# Patient Record
Sex: FEMALE | Race: WHITE | NOT HISPANIC OR LATINO | Employment: FULL TIME | URBAN - NONMETROPOLITAN AREA
[De-identification: names, ages, dates, MRNs, and addresses within clinical notes are randomized per-mention and may not be internally consistent; named-entity substitution may affect disease eponyms.]

---

## 2024-11-21 ENCOUNTER — OFFICE VISIT (OUTPATIENT)
Dept: URGENT CARE | Age: 69
End: 2024-11-21
Payer: COMMERCIAL

## 2024-11-21 VITALS
WEIGHT: 154.32 LBS | RESPIRATION RATE: 16 BRPM | OXYGEN SATURATION: 99 % | DIASTOLIC BLOOD PRESSURE: 52 MMHG | SYSTOLIC BLOOD PRESSURE: 117 MMHG | HEART RATE: 96 BPM | TEMPERATURE: 98.2 F | BODY MASS INDEX: 25.71 KG/M2 | HEIGHT: 65 IN

## 2024-11-21 DIAGNOSIS — R50.9 FEVER, UNSPECIFIED FEVER CAUSE: ICD-10-CM

## 2024-11-21 DIAGNOSIS — J02.9 SORE THROAT: ICD-10-CM

## 2024-11-21 LAB
POC RAPID INFLUENZA A: NEGATIVE
POC RAPID INFLUENZA B: NEGATIVE
POC RAPID STREP: NEGATIVE
POC SARS-COV-2 AG BINAX: NORMAL

## 2024-11-21 RX ORDER — DOXYCYCLINE HYCLATE 100 MG
TABLET ORAL
COMMUNITY
Start: 2024-11-20

## 2024-11-21 RX ORDER — SIMVASTATIN 40 MG/1
1 TABLET, FILM COATED ORAL
COMMUNITY
Start: 2024-10-28

## 2024-11-21 RX ORDER — DIAZEPAM 2 MG/1
TABLET ORAL
COMMUNITY
Start: 2024-11-02

## 2024-11-21 RX ORDER — MECLIZINE HCL 12.5 MG 12.5 MG/1
12.5 TABLET ORAL EVERY 6 HOURS PRN
COMMUNITY
Start: 2024-05-02

## 2024-11-21 RX ORDER — LEVOTHYROXINE SODIUM 150 UG/1
1 TABLET ORAL
COMMUNITY
Start: 2024-11-06

## 2024-11-21 RX ORDER — LISINOPRIL 40 MG/1
40 TABLET ORAL DAILY
COMMUNITY

## 2024-11-21 RX ORDER — PENTOXIFYLLINE 400 MG/1
400 TABLET, EXTENDED RELEASE ORAL
COMMUNITY
Start: 2024-11-05

## 2024-11-21 RX ORDER — NATEGLINIDE 60 MG/1
TABLET ORAL
COMMUNITY
Start: 2024-09-04

## 2024-11-21 RX ORDER — LEVOFLOXACIN 250 MG/1
1 TABLET ORAL
COMMUNITY
Start: 2024-04-08

## 2024-11-21 RX ORDER — METHOCARBAMOL 750 MG/1
TABLET, FILM COATED ORAL
COMMUNITY

## 2024-11-21 RX ORDER — CLONIDINE HYDROCHLORIDE 0.1 MG/1
TABLET ORAL
COMMUNITY
Start: 2020-01-08

## 2024-11-21 RX ORDER — GABAPENTIN 300 MG/1
1 CAPSULE ORAL
COMMUNITY
Start: 2024-11-12

## 2024-11-21 RX ORDER — HYDROCHLOROTHIAZIDE 25 MG/1
TABLET ORAL EVERY 24 HOURS
COMMUNITY

## 2024-11-21 RX ORDER — FAMOTIDINE 20 MG/1
1 TABLET, FILM COATED ORAL
COMMUNITY
Start: 2024-10-28

## 2024-11-21 RX ORDER — CYCLOBENZAPRINE HCL 10 MG
TABLET ORAL
COMMUNITY
Start: 2024-11-02

## 2024-11-21 RX ORDER — AMLODIPINE BESYLATE 5 MG/1
1 TABLET ORAL
COMMUNITY
Start: 2024-10-28

## 2024-11-21 RX ORDER — NAPROXEN 500 MG/1
TABLET ORAL
COMMUNITY

## 2024-11-21 RX ORDER — DAPAGLIFLOZIN 10 MG/1
TABLET, FILM COATED ORAL
COMMUNITY
Start: 2024-11-14

## 2024-11-21 RX ORDER — CLOPIDOGREL BISULFATE 75 MG/1
1 TABLET ORAL
COMMUNITY
Start: 2024-11-06

## 2024-11-21 ASSESSMENT — ENCOUNTER SYMPTOMS
SORE THROAT: 1
COUGH: 1

## 2024-11-21 NOTE — PROGRESS NOTES
"Subjective   Patient ID: Yamileth Mccormack is a 69 y.o. female. They present today with a chief complaint of Fever (Symptoms since yesterday), Other (chills), and Sore Throat.    History of Present Illness  Patient reports cough and congestion and fever yesterday, denies chest pain or shortness of breath.            Past Medical History  Allergies as of 11/21/2024 - Reviewed 11/21/2024   Allergen Reaction Noted    Augmentin [amoxicillin-pot clavulanate] GI Upset 11/21/2024    Codeine Hallucinations 11/21/2024       (Not in a hospital admission)       No past medical history on file.    Past Surgical History:   Procedure Laterality Date    CT ABDOMEN PELVIS ANGIOGRAM W AND/OR WO IV CONTRAST  3/29/2013    CT ABDOMEN PELVIS ANGIOGRAM W AND/OR WO IV CONTRAST [facility]        reports that she has been smoking cigarettes. She has never used smokeless tobacco. She reports that she does not currently use alcohol. She reports that she does not use drugs.    Review of Systems  Review of Systems   HENT:  Positive for congestion and sore throat.    Respiratory:  Positive for cough.    All other systems reviewed and are negative.                                 Objective    Vitals:    11/21/24 1105   BP: 117/52   BP Location: Left arm   Patient Position: Sitting   BP Cuff Size: Large adult   Pulse: 96   Resp: 16   Temp: 36.8 °C (98.2 °F)   TempSrc: Oral   SpO2: 99%   Weight: 70 kg (154 lb 5.2 oz)   Height: 1.651 m (5' 5\")     No LMP recorded.    Physical Exam  Vitals reviewed.   Constitutional:       Appearance: Normal appearance.   HENT:      Head: Normocephalic and atraumatic.      Right Ear: Tympanic membrane, ear canal and external ear normal.      Left Ear: Tympanic membrane, ear canal and external ear normal.      Nose: Congestion present.      Mouth/Throat:      Mouth: Mucous membranes are moist.      Pharynx: Oropharynx is clear.   Eyes:      Extraocular Movements: Extraocular movements intact.      " Conjunctiva/sclera: Conjunctivae normal.      Pupils: Pupils are equal, round, and reactive to light.   Cardiovascular:      Rate and Rhythm: Normal rate and regular rhythm.      Pulses: Normal pulses.      Heart sounds: Normal heart sounds.   Pulmonary:      Effort: Pulmonary effort is normal.      Breath sounds: Normal breath sounds.   Abdominal:      General: Abdomen is flat. Bowel sounds are normal.      Palpations: Abdomen is soft.   Musculoskeletal:         General: Normal range of motion.      Cervical back: Normal range of motion.   Skin:     General: Skin is warm.      Capillary Refill: Capillary refill takes less than 2 seconds.   Neurological:      General: No focal deficit present.      Mental Status: She is alert and oriented to person, place, and time.   Psychiatric:         Mood and Affect: Mood normal.         Behavior: Behavior normal.         Procedures    Point of Care Test & Imaging Results from this visit  Results for orders placed or performed in visit on 11/21/24   POCT Covid-19 Rapid Antigen   Result Value Ref Range    POC JULIUS-COV-2 AG  Presumptive negative test for SARS-CoV-2 (no antigen detected)     Presumptive negative test for SARS-CoV-2 (no antigen detected)   POCT Influenza A/B manually resulted   Result Value Ref Range    POC Rapid Influenza A Negative Negative    POC Rapid Influenza B Negative Negative   POCT rapid strep A manually resulted   Result Value Ref Range    POC Rapid Strep Negative Negative      No results found.    Diagnostic study results (if any) were reviewed by OPHELIA Marte.    Assessment/Plan   Allergies, medications, history, and pertinent labs/EKGs/Imaging reviewed by OPHELIA Marte.     Medical Decision Making  Patient in NAD, VSS, HRR, lungs clear.  Reports cough and congestion, fever and sore throat.  Strep, covid and flu negative  Her PCP prescribed Doxy, start that as prescribed, go to ED with any worsening symptoms,  patient agrees with plan of care.      Orders and Diagnoses  Diagnoses and all orders for this visit:  Sore throat  -     POCT rapid strep A manually resulted  Fever, unspecified fever cause  -     POCT Covid-19 Rapid Antigen  -     POCT Influenza A/B manually resulted      Medical Admin Record      Patient disposition: Home    Electronically signed by OPHELIA Marte  11:27 AM

## 2024-11-21 NOTE — LETTER
December 9, 2024     Patient: Yamileth Mccormack   YOB: 1955   Date of Visit: 11/21/2024       To Whom It May Concern:    Yamileth Mccormack was seen in my clinic on 11/21/2024. Please excuse Yamileth for her absence from work on this day to make the appointment.    If you have any questions or concerns, please don't hesitate to call.         Sincerely,         Elizabeth Lieberman, APRN-CNP        CC: No Recipients

## 2025-05-16 ENCOUNTER — NURSING HOME VISIT (OUTPATIENT)
Dept: POST ACUTE CARE | Facility: EXTERNAL LOCATION | Age: 70
End: 2025-05-16
Payer: MEDICARE

## 2025-05-16 DIAGNOSIS — Z72.0 NICOTINE ABUSE: ICD-10-CM

## 2025-05-16 DIAGNOSIS — E03.9 HYPOTHYROIDISM, UNSPECIFIED TYPE: ICD-10-CM

## 2025-05-16 DIAGNOSIS — I10 PRIMARY HYPERTENSION: ICD-10-CM

## 2025-05-16 DIAGNOSIS — N18.32 CHRONIC KIDNEY DISEASE, STAGE 3B (MULTI): ICD-10-CM

## 2025-05-16 DIAGNOSIS — R53.81 PHYSICAL DECONDITIONING: ICD-10-CM

## 2025-05-16 DIAGNOSIS — M00.9 INFECTION OF LEFT KNEE (MULTI): Primary | ICD-10-CM

## 2025-05-16 DIAGNOSIS — R11.2 NAUSEA AND VOMITING, UNSPECIFIED VOMITING TYPE: ICD-10-CM

## 2025-05-16 DIAGNOSIS — D64.9 ANEMIA, UNSPECIFIED TYPE: ICD-10-CM

## 2025-05-16 DIAGNOSIS — Z98.890 S/P OPEN REDUCTION AND INTERNAL FIXATION (ORIF) OF FRACTURE OF LEFT PATELLA: ICD-10-CM

## 2025-05-16 DIAGNOSIS — Z87.81 S/P OPEN REDUCTION AND INTERNAL FIXATION (ORIF) OF FRACTURE OF LEFT PATELLA: ICD-10-CM

## 2025-05-16 DIAGNOSIS — I73.9 PAD (PERIPHERAL ARTERY DISEASE): ICD-10-CM

## 2025-05-16 DIAGNOSIS — I25.10 CORONARY ARTERY DISEASE INVOLVING NATIVE CORONARY ARTERY OF NATIVE HEART WITHOUT ANGINA PECTORIS: ICD-10-CM

## 2025-05-16 DIAGNOSIS — E78.5 HYPERLIPIDEMIA, UNSPECIFIED HYPERLIPIDEMIA TYPE: ICD-10-CM

## 2025-05-16 DIAGNOSIS — K21.9 GASTROESOPHAGEAL REFLUX DISEASE WITHOUT ESOPHAGITIS: ICD-10-CM

## 2025-05-16 PROCEDURE — 99310 SBSQ NF CARE HIGH MDM 45: CPT | Performed by: NURSE PRACTITIONER

## 2025-05-16 NOTE — LETTER
Patient: Yamileth Mccormack  : 1955    Encounter Date: 2025    Chief Complaint:   SNF F/U  -Left knee infection  -Physical deconditioning/weakness    HPI:   70 year-old female with PMH of CAD, hypothyroidism, HLD, CKD Stage III, nicotine dependence, HTN, mood disorder, and L patellar surgery (3/25/25) who presented to the ER on 25 w/ c/f L knee surgical wound infection. Pt. reported purulent drainage to her incision. Her [facility] nurse advised her to go to the ER for evaluation. Work-up in ER: Cr 1.5, XR of L knee neg for joint effusion or patella hardware malfunction. She was started on IV ATB and admitted for further care. Hospital course:    Left knee infection-IV ATB, ortho and ID consult, knee brace placed locked in extension, WBAT, ID recommending 4 weeks of ATB therapy (doxycycline and augmentin), F/U with ID and ortho as scheduled  Physical deconditioning/weakness-PT/OT recommending SNF  HTN-BP medication adjusted, BP monitored  KENDELL-renal function monitored  Multiple BLE ulcers-local wound care, F/U with podiatry/vascular as an OP     Pt. was HDS and discharged to [facility] on 25. Today, patient reports poor PO intake, nausea, and vomiting, which she feels is 2/2 Augmentin. Staff report that she has been refusing to take Augmentin due to stomach upset/GI symptoms. She denies fevers or chills. She reports L knee pain well-controlled on current Rx. Staff report no other clinical concerns at this time.     ROS:    As above in HPI. Otherwise, all other systems have been reviewed and are negative for complaint.    Medications reviewed and verified in NH chart.     Problem List[1]     Medical History[2]    Surgical History[3]    Family History[4]    Tobacco Use History[5]    Social History     Substance and Sexual Activity   Alcohol Use Not Currently       Social History     Substance and Sexual Activity   Drug Use Never       Allergies[6]     Vital Signs:   107/76-80-18-98.2-98% on RA    Physical  Exam:  General: Sitting up in WC in NAD, alert   Head/Face: NCAT, symmetrical  Eyes: PERRLA, no injection, no discharge  ENT: Hearing not impaired, ears without scars or lesions, nasal mucosa and turbinates pink, septum midline, lips pink and moist  Neck: Supple, symmetrical  Respiratory: CTA without adventitious sounds, respirations even and nonlabored without use of accessory muscles, good air exchange  Cardio: RRR without murmur or gallops, normal S1S2, no edema, pedal pulses 3+/4 bilaterally  Chest/Breast: Symmetrical  GI: BS x 4, normoactive, non-distended, abd round and soft, no masses or tenderness  : No suprapubic tenderness or distention  MSK: Gait not assessed, joints with full ROM without pain or contractures, + generalized weakness, KI locked in extension to LLE  Skin: Skin warm and dry, no induration, DSG to L knee D&I, unstageable ulcer of L shin, L anterior foot, L knee, L heel, and L achilles   Neurologic: Cranial nerves II through XII intact, superficial touch and pain sensation intact  Psychiatric: Alert to person, place, and time, calm and cooperative     Results/Data:   5/12/25: K+ 3.4, GFR 55, TSH 0.741, HgbA1C 6.3, Hgb 9.8, Hct 30.4    Assessment/Plan:  Left knee infection s/p ORIF of L patella fracture (3/25/25)-discussed patient's refusal of Augmentin with ID, can D/C Augmentin and start on Cefuroxime 500 mg PO BID, c/w Doxycycline, ATB end date 5/28, c/w Tylenol prn for mild pain, Percocet prn for mod-severe pain, monitor labs, c/w local wound care, F/U with ortho and ID as scheduled  N/V/GI upset-2/2 Augmentin, D/C'ed per ID, start on acidophilus 1 capsule PO BID, Zofran prn, monitor  Physical deconditioning/weakness-c/w PT/OT, safety and fall precautions  HTN/HLD/CAD/PAD/PVD-2 gm Na+ diet, c/w atorvastatin, plavix, trental, nifedipine (decrease to 60 mg daily), HCTZ, and lisinopril, monitor BP and HR, monitor lipid panel and LFTs, F/U with vascular after discharge  Multiple BLE  ulcers-c/w local wound care, increase protein intake, wound care team to follow, F/U with podiatry and vascular after discharge  DM2-CCD diet, accuchecks, c/w lispro ISS and farxiga, monitor HgbA1C  Tobacco abuse-smoking cessation encouraged, c/w nicotine patch  Hypothyroidism-c/w levothyroxine, monitor TSH/FT4  Anemia-c/w MVI, monitor CBC  GERD-c/w pepcid  Neuropathy-c/w gabapentin   CKD Stage III-avoid nephrotoxic drugs, renally dose medications as able, monitor BMP     Orders:  D/C Augmentin   Cefuroxime 500 mg PO BID (stop date 5/28)   Decrease Nifedipine ER to 60 mg PO Q AM   Zofran 4 mg PO Q 6 hours prn for N/V   Acidophilus 1 capsule PO BID      Code Status:   Full Code    Time spent reviewing patient's chart on the unit (PMH, PSH, FH, Social Hx AND progress and/or consult notes, labs, and radiology results): 27 minutes  Time spent interviewing and/or examining the patient: 11 minutes  Time spent writing note on the unit: 5 minutes  Time spent reviewing POC w/ patient, family, and/or staff: 7 minutes  Total visit time: 50 minutes. Greater than 50% of time was spent on counseling and/or coordination of care of the patient. Start time: 12:59 PM, End time: 1:49 PM.          Electronically Signed By: OPHELIA Syed   8/4/25  9:29 PM       [1]  Patient Active Problem List  Diagnosis   • S/P open reduction and internal fixation (ORIF) of fracture of left patella   • Postoperative infection   • Hypertension   • Open wound of both lower extremities   • PVD (peripheral vascular disease)   • Gastroesophageal reflux disease without esophagitis   • Coronary artery disease involving native coronary artery of native heart without angina pectoris   • Hyperlipidemia   • DM type 2 with diabetic peripheral neuropathy   • Hypothyroidism   • Nicotine abuse   • Chronic kidney disease, stage 3b (Multi)   • Hyponatremia   • Anemia   • PAD (peripheral artery disease)   • Vitamin D deficiency   • Mesenteric artery  stenosis (Multi)   • Other insomnia   • Stenosis of iliac artery   • Dermatitis   [2]  Past Medical History:  Diagnosis Date   • Pancreatitis ([facility])    [3]  Past Surgical History:  Procedure Laterality Date   • APPENDECTOMY     • CHOLECYSTECTOMY     • CT ABDOMEN PELVIS ANGIOGRAM W AND/OR WO IV CONTRAST  03/29/2013    CT ABDOMEN PELVIS ANGIOGRAM W AND/OR WO IV CONTRAST [facility]   • OTHER SURGICAL HISTORY      excision of pilonidal cyst   • OTHER SURGICAL HISTORY      R iliac artery stent, aortogram/angiogram bilateral legs   • OTHER SURGICAL HISTORY  09/27/2021    left external iliac artery drug coated balloon angioplasty, left external iliac artery stent placement, and repair of left brachial artery   • PATELLA FRACTURE SURGERY Left 03/25/2025    ORIF of L patella fracture   • TRIGGER FINGER RELEASE Left    [4]  Family History  Problem Relation Name Age of Onset   • COPD Mother     • Hypertension Mother     • Other (Polio) Mother     • Stroke Father     • Cancer Brother     • Heart disease Brother     • COPD Brother     • Kidney disease Brother     • Multiple sclerosis Brother     • Diabetes Mother's Brother     • Breast cancer Maternal Grandmother     • Diabetes Paternal Grandmother     • Blindness Paternal Grandmother     [5]  Social History  Tobacco Use   Smoking Status Every Day   • Types: Cigarettes   Smokeless Tobacco Never   [6]  Allergies  Allergen Reactions   • Other Shortness of breath     Horse hair and dander   • Hydrocodone-Acetaminophen Other and Unknown   • Statins-Hmg-Coa Reductase Inhibitors Myalgia     Lipitor, severe muscle pain   • Augmentin [Amoxicillin-Pot Clavulanate] GI Upset   • Codeine Hallucinations   • Sertraline Unknown   • Hydromorphone (Bulk) GI Upset   • Levofloxacin Unknown and Nausea/vomiting     Pt had nausea and vomiting   • Nsaids (Non-Steroidal Anti-Inflammatory Drug) GI Upset     g.i. distress

## 2025-05-17 DIAGNOSIS — F11.90 OPIOID USE: ICD-10-CM

## 2025-05-17 DIAGNOSIS — M00.9 INFECTION OF LEFT KNEE (MULTI): Primary | ICD-10-CM

## 2025-05-17 RX ORDER — OXYCODONE AND ACETAMINOPHEN 5; 325 MG/1; MG/1
1 TABLET ORAL EVERY 8 HOURS PRN
Qty: 30 TABLET | Refills: 0 | Status: SHIPPED | OUTPATIENT
Start: 2025-05-17 | End: 2025-05-31

## 2025-05-17 RX ORDER — NALOXONE HYDROCHLORIDE 4 MG/.1ML
1 SPRAY NASAL AS NEEDED
Qty: 2 EACH | Refills: 0 | Status: SHIPPED | OUTPATIENT
Start: 2025-05-17

## 2025-05-22 ENCOUNTER — NURSING HOME VISIT (OUTPATIENT)
Dept: POST ACUTE CARE | Facility: EXTERNAL LOCATION | Age: 70
End: 2025-05-22
Payer: MEDICARE

## 2025-05-22 DIAGNOSIS — E78.5 HYPERLIPIDEMIA, UNSPECIFIED HYPERLIPIDEMIA TYPE: ICD-10-CM

## 2025-05-22 DIAGNOSIS — Z87.81 S/P OPEN REDUCTION AND INTERNAL FIXATION (ORIF) OF FRACTURE OF LEFT PATELLA: ICD-10-CM

## 2025-05-22 DIAGNOSIS — Z72.0 NICOTINE ABUSE: ICD-10-CM

## 2025-05-22 DIAGNOSIS — E03.9 HYPOTHYROIDISM, UNSPECIFIED TYPE: ICD-10-CM

## 2025-05-22 DIAGNOSIS — I10 HYPERTENSION, UNSPECIFIED TYPE: ICD-10-CM

## 2025-05-22 DIAGNOSIS — T81.40XD POSTOPERATIVE INFECTION, UNSPECIFIED TYPE, SUBSEQUENT ENCOUNTER: ICD-10-CM

## 2025-05-22 DIAGNOSIS — S81.802D OPEN WOUND OF BOTH LOWER EXTREMITIES, SUBSEQUENT ENCOUNTER: ICD-10-CM

## 2025-05-22 DIAGNOSIS — S81.801D OPEN WOUND OF BOTH LOWER EXTREMITIES, SUBSEQUENT ENCOUNTER: ICD-10-CM

## 2025-05-22 DIAGNOSIS — E11.42 DM TYPE 2 WITH DIABETIC PERIPHERAL NEUROPATHY: ICD-10-CM

## 2025-05-22 DIAGNOSIS — I73.9 PVD (PERIPHERAL VASCULAR DISEASE): ICD-10-CM

## 2025-05-22 DIAGNOSIS — N18.9 CHRONIC KIDNEY DISEASE, UNSPECIFIED CKD STAGE: ICD-10-CM

## 2025-05-22 DIAGNOSIS — Z98.890 S/P OPEN REDUCTION AND INTERNAL FIXATION (ORIF) OF FRACTURE OF LEFT PATELLA: ICD-10-CM

## 2025-05-22 DIAGNOSIS — N18.32 CHRONIC KIDNEY DISEASE, STAGE 3B (MULTI): Primary | ICD-10-CM

## 2025-05-22 DIAGNOSIS — I25.10 CORONARY ARTERY DISEASE INVOLVING NATIVE CORONARY ARTERY OF NATIVE HEART WITHOUT ANGINA PECTORIS: ICD-10-CM

## 2025-05-22 DIAGNOSIS — K21.9 GASTROESOPHAGEAL REFLUX DISEASE WITHOUT ESOPHAGITIS: ICD-10-CM

## 2025-05-22 PROCEDURE — 99305 1ST NF CARE MODERATE MDM 35: CPT | Performed by: INTERNAL MEDICINE

## 2025-05-22 NOTE — LETTER
Patient: Yamileth Mccormack  : 1955    Encounter Date: 2025    Name: Yamileth Mccormack  : 1955  MRN: [id number]  Visit Date: 2025  Chief Complaint: HISTORY AND PHYSICAL    HPI: Yamileth Mccormack is a 70 y.o. female with PMH remarkable for CAD, hypothyroidism, HLD, CKD 3, nicotine abuse, HTN, mood disorder, who recently underwent a left patellar surgery on 3/25/2025. Subsequently, she was receiving help from a nurse with wound care of left knee who noted purulent drainage from left knee incision upon changing dressing and thus she was directed to the ER.  Labs in ER showed creatnine of 1.5, no leukocytosis. X-ray of the left knee was negative for joint effusion or patella hardware malfunction. She was admitted to [facility] on 25, ID and ortho were consulted and she was started on IV vancomycin and Zosyn. She had PICC line placed.  Orthopedics recommended Postoperative range of motion knee brace placed with locked in extension, activity as tolerated, and weight bearing as tolerated on the left lower extremity. ID recommended 4 weeks of antibiotic therapy, doxycycline and Augmentin upon discharge. While inpatient her BP was elevated and her medication was adjusted. She was followed by podiatry while inpatient for multiple foot wounds, dressings were ordered. Once HDS, she was discharged to [facility] on 25.    Subjective: Seen and examined today. She is lying in bed awake in no acute distress. She offers no new issues or complaints.     The patient was counseled regarding diagnostic results, instructions for management, risk factor reductions, prognosis, patient and family education, impressions, risks and benefits of treatment options and importance of compliance with treatment. I have reviewed nursing notes since my last visit and document any significant changes Reviewed orders, medications, Labs. Reviewed chart looking at current medications, treatments, labs, x-rays etc.     ROS:  As above in  subjective. Otherwise, all other systems have been reviewed and are negative for complaint.    Medications:  Medications reviewed and verified in NH chart.     Medical History[1]    Surgical History[2]    Family History[3]    Social History     Tobacco Use   • Smoking status: Every Day     Types: Cigarettes   • Smokeless tobacco: Never   Substance Use Topics   • Alcohol use: Not Currently       Allergies[4]     Vital Signs:   Vital Signs were reviewed in nursing home documentation.    Physical Exam  Vitals and nursing note reviewed.   Constitutional:       Appearance: Normal appearance.   HENT:      Head: Normocephalic and atraumatic.      Nose: Nose normal.      Mouth/Throat:      Mouth: Mucous membranes are moist.   Cardiovascular:      Rate and Rhythm: Normal rate and regular rhythm.      Pulses: Normal pulses.      Heart sounds: Normal heart sounds.   Pulmonary:      Effort: Pulmonary effort is normal.      Breath sounds: Normal breath sounds.   Abdominal:      General: Bowel sounds are normal.      Palpations: Abdomen is soft.   Musculoskeletal:         General: Normal range of motion.      Cervical back: Normal range of motion.      Comments: Dressings in place BLE's   Skin:     General: Skin is warm and dry.   Neurological:      General: No focal deficit present.      Mental Status: She is alert and oriented to person, place, and time.   Psychiatric:         Mood and Affect: Mood normal.         Behavior: Behavior normal.         Results/Data:   Lab Results   Component Value Date    WBC 7.3 01/08/2020    HGB 11.7 (L) 01/08/2020    HCT 35.8 (L) 01/08/2020     01/08/2020    ALT 18 01/08/2020    AST 17 01/08/2020     01/08/2020    K 4.1 01/08/2020     01/08/2020    CREATININE 1.09 (H) 01/08/2020    BUN 33 (H) 01/08/2020    CO2 23 01/08/2020    HGBA1C 6.1 (H) 10/16/2024     Results were reviewed and addressed accordingly. Lab Results were also reviewed in [facility].     Provider Impression:    Status post open reduction internal fixation left patella with postoperative surgical wound infection   -Status post open reduction internal fixation left patella on 3/25/2025  - continue with dressing changes to left knee  - monitor for healing  - continue with PO Doxycycline and Augmentin for four weeks, tentative stop date 5/28/25 per ID  - follow up with ID  - follow up with Ortho  - continue with pain medications  - consult PT/OT, ortho rec WBAT, activity as tolerated      Multiple wounds to bilateral lower extremities/history of PVD  - continue with dressing changes per podiatry rec  - monitor for healing  - follow up with podiatry  - pain medication as needed  - continue with Plavix and pentoxifylline      DM2 with peripheral neuropathy  - monitor blood sugar levels  - continue with Farxiga and lispro insulin  - continue with gabapentin for neuropathy     CAD status post stent placement  - continue with Plavix, lipid lowering agent    Hyperlipidemia  - Continue with statin     HTN  - Continue with HCTZ 25 mg p.o. daily  - Continue with lisinopril 40 mg p.o. daily and Nifedipine(increased from 60mg to 90mg while inpatient)  - monitor vital signs    Hypothyroidism  - Continue with Synthroid     Nicotine abuse disorder  - Continue nicotine patch    CKD  - monitor kidney function    GERD  - continue with Pepcid  ----------------  Written by Sima Arechiga RN, acting as a scribe for Dr. Zuniga. This note accurately reflects the work and decisions made by Dr. Zuniga.     I, Dr. Zuniga, attest all medical record entries made by the scribe were under my direction and were personally dictated by me. I have reviewed the chart and agree that the record accurately reflects my performance of the history, physical exam, and assessment and plan.         Electronically Signed By: Wilber Moe MD   5/30/25 12:35 PM       [1]  No past medical history on file.  [2]  Past Surgical History:  Procedure Laterality Date   • CT ABDOMEN  PELVIS ANGIOGRAM W AND/OR WO IV CONTRAST  3/29/2013    CT ABDOMEN PELVIS ANGIOGRAM W AND/OR WO IV CONTRAST [facility]   [3]  No family history on file.  [4]  Allergies  Allergen Reactions   • Augmentin [Amoxicillin-Pot Clavulanate] GI Upset   • Codeine Hallucinations

## 2025-05-29 PROBLEM — I25.10 CORONARY ARTERY DISEASE INVOLVING NATIVE CORONARY ARTERY OF NATIVE HEART WITHOUT ANGINA PECTORIS: Status: ACTIVE | Noted: 2025-05-29

## 2025-05-29 PROBLEM — Z98.890: Status: ACTIVE | Noted: 2025-05-29

## 2025-05-29 PROBLEM — N18.9 CHRONIC KIDNEY DISEASE: Status: ACTIVE | Noted: 2025-05-29

## 2025-05-29 PROBLEM — I10 HYPERTENSION: Status: ACTIVE | Noted: 2025-05-29

## 2025-05-29 PROBLEM — T81.40XA POSTOPERATIVE INFECTION: Status: ACTIVE | Noted: 2025-05-29

## 2025-05-29 PROBLEM — Z87.81: Status: ACTIVE | Noted: 2025-05-29

## 2025-05-29 PROBLEM — E11.42 DM TYPE 2 WITH DIABETIC PERIPHERAL NEUROPATHY: Status: ACTIVE | Noted: 2025-05-29

## 2025-05-29 PROBLEM — S81.802A OPEN WOUND OF BOTH LOWER EXTREMITIES: Status: ACTIVE | Noted: 2025-05-29

## 2025-05-29 PROBLEM — S81.801A OPEN WOUND OF BOTH LOWER EXTREMITIES: Status: ACTIVE | Noted: 2025-05-29

## 2025-05-29 PROBLEM — E03.9 HYPOTHYROIDISM: Status: ACTIVE | Noted: 2025-05-29

## 2025-05-29 PROBLEM — E78.5 HYPERLIPIDEMIA: Status: ACTIVE | Noted: 2025-05-29

## 2025-05-29 PROBLEM — I73.9 PVD (PERIPHERAL VASCULAR DISEASE): Status: ACTIVE | Noted: 2025-05-29

## 2025-05-29 PROBLEM — Z72.0 NICOTINE ABUSE: Status: ACTIVE | Noted: 2025-05-29

## 2025-05-29 PROBLEM — K21.9 GASTROESOPHAGEAL REFLUX DISEASE WITHOUT ESOPHAGITIS: Status: ACTIVE | Noted: 2025-05-29

## 2025-05-29 NOTE — PROGRESS NOTES
Name: Yamileth Mccormack  : 1955  MRN: [id number]  Visit Date: 2025  Chief Complaint: HISTORY AND PHYSICAL    HPI: Yamileth Mccormack is a 70 y.o. female with PMH remarkable for CAD, hypothyroidism, HLD, CKD 3, nicotine abuse, HTN, mood disorder, who recently underwent a left patellar surgery on 3/25/2025. Subsequently, she was receiving help from a nurse with wound care of left knee who noted purulent drainage from left knee incision upon changing dressing and thus she was directed to the ER.  Labs in ER showed creatnine of 1.5, no leukocytosis. X-ray of the left knee was negative for joint effusion or patella hardware malfunction. She was admitted to [facility] on 25, ID and ortho were consulted and she was started on IV vancomycin and Zosyn. She had PICC line placed.  Orthopedics recommended Postoperative range of motion knee brace placed with locked in extension, activity as tolerated, and weight bearing as tolerated on the left lower extremity. ID recommended 4 weeks of antibiotic therapy, doxycycline and Augmentin upon discharge. While inpatient her BP was elevated and her medication was adjusted. She was followed by podiatry while inpatient for multiple foot wounds, dressings were ordered. Once HDS, she was discharged to [facility] on 25.    Subjective: Seen and examined today. She is lying in bed awake in no acute distress. She offers no new issues or complaints.     The patient was counseled regarding diagnostic results, instructions for management, risk factor reductions, prognosis, patient and family education, impressions, risks and benefits of treatment options and importance of compliance with treatment. I have reviewed nursing notes since my last visit and document any significant changes Reviewed orders, medications, Labs. Reviewed chart looking at current medications, treatments, labs, x-rays etc.     ROS:  As above in subjective. Otherwise, all other systems have been reviewed and are  negative for complaint.    Medications:  Medications reviewed and verified in NH chart.     Medical History[1]    Surgical History[2]    Family History[3]    Social History     Tobacco Use    Smoking status: Every Day     Types: Cigarettes    Smokeless tobacco: Never   Substance Use Topics    Alcohol use: Not Currently       Allergies[4]     Vital Signs:   Vital Signs were reviewed in nursing home documentation.    Physical Exam  Vitals and nursing note reviewed.   Constitutional:       Appearance: Normal appearance.   HENT:      Head: Normocephalic and atraumatic.      Nose: Nose normal.      Mouth/Throat:      Mouth: Mucous membranes are moist.   Cardiovascular:      Rate and Rhythm: Normal rate and regular rhythm.      Pulses: Normal pulses.      Heart sounds: Normal heart sounds.   Pulmonary:      Effort: Pulmonary effort is normal.      Breath sounds: Normal breath sounds.   Abdominal:      General: Bowel sounds are normal.      Palpations: Abdomen is soft.   Musculoskeletal:         General: Normal range of motion.      Cervical back: Normal range of motion.      Comments: Dressings in place BLE's   Skin:     General: Skin is warm and dry.   Neurological:      General: No focal deficit present.      Mental Status: She is alert and oriented to person, place, and time.   Psychiatric:         Mood and Affect: Mood normal.         Behavior: Behavior normal.         Results/Data:   Lab Results   Component Value Date    WBC 7.3 01/08/2020    HGB 11.7 (L) 01/08/2020    HCT 35.8 (L) 01/08/2020     01/08/2020    ALT 18 01/08/2020    AST 17 01/08/2020     01/08/2020    K 4.1 01/08/2020     01/08/2020    CREATININE 1.09 (H) 01/08/2020    BUN 33 (H) 01/08/2020    CO2 23 01/08/2020    HGBA1C 6.1 (H) 10/16/2024     Results were reviewed and addressed accordingly. Lab Results were also reviewed in [facility].     Provider Impression:   Status post open reduction internal fixation left patella with  postoperative surgical wound infection   -Status post open reduction internal fixation left patella on 3/25/2025  - continue with dressing changes to left knee  - monitor for healing  - continue with PO Doxycycline and Augmentin for four weeks, tentative stop date 5/28/25 per ID  - follow up with ID  - follow up with Ortho  - continue with pain medications  - consult PT/OT, ortho rec WBAT, activity as tolerated      Multiple wounds to bilateral lower extremities/history of PVD  - continue with dressing changes per podiatry rec  - monitor for healing  - follow up with podiatry  - pain medication as needed  - continue with Plavix and pentoxifylline      DM2 with peripheral neuropathy  - monitor blood sugar levels  - continue with Farxiga and lispro insulin  - continue with gabapentin for neuropathy     CAD status post stent placement  - continue with Plavix, lipid lowering agent    Hyperlipidemia  - Continue with statin     HTN  - Continue with HCTZ 25 mg p.o. daily  - Continue with lisinopril 40 mg p.o. daily and Nifedipine(increased from 60mg to 90mg while inpatient)  - monitor vital signs    Hypothyroidism  - Continue with Synthroid     Nicotine abuse disorder  - Continue nicotine patch    CKD  - monitor kidney function    GERD  - continue with Pepcid  ----------------  Written by Sima Arechiga RN, acting as a scribe for Dr. Zuniga. This note accurately reflects the work and decisions made by Dr. Zuniga.     I, Dr. uZniga, attest all medical record entries made by the scribe were under my direction and were personally dictated by me. I have reviewed the chart and agree that the record accurately reflects my performance of the history, physical exam, and assessment and plan.          [1] No past medical history on file.  [2]   Past Surgical History:  Procedure Laterality Date    CT ABDOMEN PELVIS ANGIOGRAM W AND/OR WO IV CONTRAST  3/29/2013    CT ABDOMEN PELVIS ANGIOGRAM W AND/OR WO IV CONTRAST [facility]   [3] No  family history on file.  [4]   Allergies  Allergen Reactions    Augmentin [Amoxicillin-Pot Clavulanate] GI Upset    Codeine Hallucinations

## 2025-05-30 PROBLEM — N18.32 CHRONIC KIDNEY DISEASE, STAGE 3B (MULTI): Status: ACTIVE | Noted: 2025-05-30

## 2025-06-11 ENCOUNTER — NURSING HOME VISIT (OUTPATIENT)
Dept: POST ACUTE CARE | Facility: EXTERNAL LOCATION | Age: 70
End: 2025-06-11
Payer: MEDICARE

## 2025-06-11 DIAGNOSIS — I10 HYPERTENSION, UNSPECIFIED TYPE: ICD-10-CM

## 2025-06-11 DIAGNOSIS — N18.32 CHRONIC KIDNEY DISEASE, STAGE 3B (MULTI): ICD-10-CM

## 2025-06-11 DIAGNOSIS — I73.9 PVD (PERIPHERAL VASCULAR DISEASE): ICD-10-CM

## 2025-06-11 DIAGNOSIS — K21.9 GASTROESOPHAGEAL REFLUX DISEASE WITHOUT ESOPHAGITIS: ICD-10-CM

## 2025-06-11 DIAGNOSIS — I25.10 CORONARY ARTERY DISEASE INVOLVING NATIVE CORONARY ARTERY OF NATIVE HEART WITHOUT ANGINA PECTORIS: ICD-10-CM

## 2025-06-11 DIAGNOSIS — S81.801D OPEN WOUND OF BOTH LOWER EXTREMITIES, SUBSEQUENT ENCOUNTER: ICD-10-CM

## 2025-06-11 DIAGNOSIS — T81.40XD POSTOPERATIVE INFECTION, UNSPECIFIED TYPE, SUBSEQUENT ENCOUNTER: ICD-10-CM

## 2025-06-11 DIAGNOSIS — Z87.81 S/P OPEN REDUCTION AND INTERNAL FIXATION (ORIF) OF FRACTURE OF LEFT PATELLA: ICD-10-CM

## 2025-06-11 DIAGNOSIS — E78.5 HYPERLIPIDEMIA, UNSPECIFIED HYPERLIPIDEMIA TYPE: ICD-10-CM

## 2025-06-11 DIAGNOSIS — Z98.890 S/P OPEN REDUCTION AND INTERNAL FIXATION (ORIF) OF FRACTURE OF LEFT PATELLA: ICD-10-CM

## 2025-06-11 DIAGNOSIS — E11.42 DM TYPE 2 WITH DIABETIC PERIPHERAL NEUROPATHY: ICD-10-CM

## 2025-06-11 DIAGNOSIS — S81.802D OPEN WOUND OF BOTH LOWER EXTREMITIES, SUBSEQUENT ENCOUNTER: ICD-10-CM

## 2025-06-11 DIAGNOSIS — Z72.0 NICOTINE ABUSE: ICD-10-CM

## 2025-06-11 DIAGNOSIS — E03.9 HYPOTHYROIDISM, UNSPECIFIED TYPE: ICD-10-CM

## 2025-06-11 PROCEDURE — 99305 1ST NF CARE MODERATE MDM 35: CPT | Performed by: INTERNAL MEDICINE

## 2025-06-11 NOTE — LETTER
Patient: Yamileth Mccormack  : 1955    Encounter Date: 2025    Name: Yamileth Mccormack  : 1955  MRN: [id number]  Visit Date: 2025  Chief Complaint: HISTORY AND PHYSICAL    HPI: Yamileth Mccormack is a 70 y.o. female with PMH remarkable for CAD, hypothyroidism, HLD, CKD 3, nicotine abuse, HTN, mood disorder, who recently underwent a left patellar surgery on 3/25/2025. Subsequently, she was receiving help from a nurse with wound care of left knee who noted purulent drainage from left knee incision upon changing dressing and thus she was directed to the ER. She presented to the ER at [facility] on 25.  Labs in ER at that time showed creatnine of 1.5, no leukocytosis. X-ray of the left knee was negative for joint effusion or patella hardware malfunction. She was admitted to [facility] on 25, ID and ortho were consulted and she was started on IV vancomycin and Zosyn. She had PICC line placed.  Orthopedics recommended Postoperative range of motion knee brace placed with locked in extension, activity as tolerated, and weight bearing as tolerated on the left lower extremity. ID recommended 4 weeks of antibiotic therapy, doxycycline and Augmentin upon discharge. While inpatient her BP was elevated and her medication was adjusted. She was followed by podiatry while inpatient for multiple foot wounds, dressings were ordered. Once HDS, she was discharged to [facility] on 25 where she stayed for skilled care until she was discharged to home on 25 with home health care in place. She then returned to the ER at [facility] from home on 25 for increased pain left knee ulcer with warmth, redness and drainage. In the ER, she was  Found to have elevated sCr(1.4) and troponin (0.2), normal WBC, XR w/ left knee effusion. She was admitted on IV Abx for wound infection, heparin infusion for NSTEMI. Orthopedics also consulted -performed Irrigation debridement left knee with application of wound VAC and injection left knee  for evaluation of joint capsular violation on 6/2/25. Wound culture grew MRSA. For NSTEMI cardiology was consulted, she underwent LHC on 6/2/25 with no significant stenosis found. Nifedipine, HCTZ discontinued, Coreg started. SNF recommended by PTOT, patient opted for home therapy instead initially, but then was agreeable to SNF placement and this was arranged. PICC line placed prior to discharge for IV Abx. She was discharged to [facility] on 6/9/25.    Subjective: Seen and examined today. She is sitting up in bed awake in no acute distress. She denies any new issues or complaints.     The patient was counseled regarding diagnostic results, instructions for management, risk factor reductions, prognosis, patient and family education, impressions, risks and benefits of treatment options and importance of compliance with treatment. I have reviewed nursing notes since my last visit and document any significant changes Reviewed orders, medications, Labs. Reviewed chart looking at current medications, treatments, labs, x-rays etc.     ROS:  As above in subjective. Otherwise, all other systems have been reviewed and are negative for complaint.    Medications:  Medications reviewed and verified in NH chart.     Medical History[1]    Surgical History[2]    Family History[3]    Social History     Tobacco Use   • Smoking status: Every Day     Types: Cigarettes   • Smokeless tobacco: Never   Substance Use Topics   • Alcohol use: Not Currently       Allergies[4]     Vital Signs:   Vital Signs were reviewed in nursing home documentation.    Physical Exam  Vitals and nursing note reviewed.   Constitutional:       Appearance: Normal appearance.   HENT:      Head: Normocephalic and atraumatic.      Nose: Nose normal.      Mouth/Throat:      Mouth: Mucous membranes are moist.   Cardiovascular:      Rate and Rhythm: Normal rate and regular rhythm.      Pulses: Normal pulses.      Heart sounds: Normal heart sounds.   Pulmonary:       Effort: Pulmonary effort is normal.      Breath sounds: Normal breath sounds.   Abdominal:      General: Bowel sounds are normal.      Palpations: Abdomen is soft.   Musculoskeletal:         General: Normal range of motion.      Cervical back: Normal range of motion.      Comments: Woundvac dressing in place to left knee   Skin:     General: Skin is warm and dry.   Neurological:      General: No focal deficit present.      Mental Status: She is alert and oriented to person, place, and time.   Psychiatric:         Mood and Affect: Mood normal.         Behavior: Behavior normal.         Results/Data:   Lab Results   Component Value Date    WBC 7.3 01/08/2020    HGB 11.7 (L) 01/08/2020    HCT 35.8 (L) 01/08/2020     01/08/2020    ALT 18 01/08/2020    AST 17 01/08/2020     01/08/2020    K 4.1 01/08/2020     01/08/2020    CREATININE 1.09 (H) 01/08/2020    BUN 33 (H) 01/08/2020    CO2 23 01/08/2020    HGBA1C 6.1 (H) 10/16/2024     Results were reviewed and addressed accordingly. Lab Results were also reviewed in [facility].     Provider Impression:   Status post open reduction internal fixation left patella with postoperative surgical wound infection   -Status post open reduction internal fixation left patella on 3/25/2025  - previous hospitalization from 4/30-->5/9/25, completed 4 week course of PO Doxycycline and Augmentin per ID rec, stop date of 5/28/25  - presented to ER again after discharge to home from this facility with sx's infection left knee, underwent Irrigation debridement left knee with application of wound VAC and injection left knee for evaluation of joint capsular violation on 6/2/25; wound cx grew MRSA  - PICC line in place for four weeks IV ATB's(Vanco) per ID  - continue with Bactrim   - PICC line care per protocol  - continue with woundvac dressing changes to left knee per ortho  - monitor for healing  - follow up with ID  - follow up with Ortho  - continue with pain medications  -  consult PT/OT     Multiple wounds to bilateral lower extremities/history of PVD  - continue with dressing changes per podiatry rec  - monitor for healing  - follow up with podiatry  - pain medication as needed  - continue with Plavix and pentoxifylline      DM2 with peripheral neuropathy  - monitor blood sugar levels  - continue with Farxiga and lispro insulin  - continue with gabapentin for neuropathy     CAD status post stent placement with NSTEMI during most recent hospitalization  - she underwent LHC on 6/2/25 while inpatient with no significant stenosis noted  - continue with Plavix, lipid lowering agent    Hyperlipidemia  - Continue with statin     HTN  - Lisinopril and hydrochlorothiazide, Nifedipine were stopped during most recent hospitalization  - she was started on Carvedilol 6.25mg bid and Amlodipine 5mg daily  - monitor vital signs    Hypothyroidism  - Continue with Synthroid  - TSH on 6/6/25 WNL     Nicotine abuse disorder  - Continue nicotine patch    CKD  - monitor kidney function which was WNL on 6/9/25    GERD  - continue with Pepcid    Hyponatremia  - monitor Na level, was 134 on 6/9/25    Anemia  - monitor CBC  ----------------  Written by Sima Arechiga RN, acting as a scribe for Dr. Zuniga. This note accurately reflects the work and decisions made by Dr. Zuniga.     I, Dr. Zuniga, attest all medical record entries made by the scribe were under my direction and were personally dictated by me. I have reviewed the chart and agree that the record accurately reflects my performance of the history, physical exam, and assessment and plan.           Electronically Signed By: Wilber Moe MD   6/27/25 11:17 AM       [1]  No past medical history on file.  [2]  Past Surgical History:  Procedure Laterality Date   • CT ABDOMEN PELVIS ANGIOGRAM W AND/OR WO IV CONTRAST  3/29/2013    CT ABDOMEN PELVIS ANGIOGRAM W AND/OR WO IV CONTRAST [facility]   [3]  No family history on file.  [4]  Allergies  Allergen  Reactions   • Augmentin [Amoxicillin-Pot Clavulanate] GI Upset   • Codeine Hallucinations

## 2025-06-13 DIAGNOSIS — T81.40XS POSTOPERATIVE INFECTION, UNSPECIFIED TYPE, SEQUELA: Primary | ICD-10-CM

## 2025-06-13 RX ORDER — OXYCODONE HYDROCHLORIDE 5 MG/1
5 TABLET ORAL EVERY 8 HOURS PRN
Qty: 21 TABLET | Refills: 0 | Status: SHIPPED | OUTPATIENT
Start: 2025-06-13 | End: 2025-06-20

## 2025-06-20 DIAGNOSIS — T81.40XS POSTOPERATIVE INFECTION, UNSPECIFIED TYPE, SEQUELA: ICD-10-CM

## 2025-06-20 RX ORDER — OXYCODONE HYDROCHLORIDE 5 MG/1
5 TABLET ORAL EVERY 8 HOURS PRN
Qty: 21 TABLET | Refills: 0 | Status: SHIPPED | OUTPATIENT
Start: 2025-06-20

## 2025-06-26 ENCOUNTER — NURSING HOME VISIT (OUTPATIENT)
Dept: POST ACUTE CARE | Facility: EXTERNAL LOCATION | Age: 70
End: 2025-06-26
Payer: MEDICARE

## 2025-06-26 DIAGNOSIS — S81.801D OPEN WOUND OF BOTH LOWER EXTREMITIES, SUBSEQUENT ENCOUNTER: ICD-10-CM

## 2025-06-26 DIAGNOSIS — Z87.81 S/P OPEN REDUCTION AND INTERNAL FIXATION (ORIF) OF FRACTURE OF LEFT PATELLA: ICD-10-CM

## 2025-06-26 DIAGNOSIS — E78.5 HYPERLIPIDEMIA, UNSPECIFIED HYPERLIPIDEMIA TYPE: ICD-10-CM

## 2025-06-26 DIAGNOSIS — I10 HYPERTENSION, UNSPECIFIED TYPE: ICD-10-CM

## 2025-06-26 DIAGNOSIS — D64.9 ANEMIA, UNSPECIFIED TYPE: ICD-10-CM

## 2025-06-26 DIAGNOSIS — N18.32 CHRONIC KIDNEY DISEASE, STAGE 3B (MULTI): ICD-10-CM

## 2025-06-26 DIAGNOSIS — Z98.890 S/P OPEN REDUCTION AND INTERNAL FIXATION (ORIF) OF FRACTURE OF LEFT PATELLA: ICD-10-CM

## 2025-06-26 DIAGNOSIS — E03.9 HYPOTHYROIDISM, UNSPECIFIED TYPE: ICD-10-CM

## 2025-06-26 DIAGNOSIS — S81.802D OPEN WOUND OF BOTH LOWER EXTREMITIES, SUBSEQUENT ENCOUNTER: ICD-10-CM

## 2025-06-26 DIAGNOSIS — T81.40XD POSTOPERATIVE INFECTION, UNSPECIFIED TYPE, SUBSEQUENT ENCOUNTER: ICD-10-CM

## 2025-06-26 DIAGNOSIS — Z72.0 NICOTINE ABUSE: ICD-10-CM

## 2025-06-26 DIAGNOSIS — I25.10 CORONARY ARTERY DISEASE INVOLVING NATIVE CORONARY ARTERY OF NATIVE HEART WITHOUT ANGINA PECTORIS: ICD-10-CM

## 2025-06-26 DIAGNOSIS — K21.9 GASTROESOPHAGEAL REFLUX DISEASE WITHOUT ESOPHAGITIS: ICD-10-CM

## 2025-06-26 DIAGNOSIS — E11.42 DM TYPE 2 WITH DIABETIC PERIPHERAL NEUROPATHY: ICD-10-CM

## 2025-06-26 DIAGNOSIS — E87.1 HYPONATREMIA: ICD-10-CM

## 2025-06-26 PROCEDURE — 99309 SBSQ NF CARE MODERATE MDM 30: CPT | Performed by: INTERNAL MEDICINE

## 2025-06-26 NOTE — LETTER
knee for evaluation of joint capsular violation on 6/2/25. Wound culture grew MRSA. For NSTEMI cardiology was consulted, she underwent LHC on 6/2/25 with no significant stenosis found. Nifedipine, HCTZ discontinued, Coreg started. SNF recommended by PTOT, patient opted for home therapy instead initially, but then was agreeable to SNF placement and this was arranged. PICC line placed prior to discharge for IV Abx. She was discharged to [facility] on 6/9/25.    Subjective: Seen and examined today. She is sitting up in bed awake in no acute distress. She denies any new issues or complaints.     ROS:  As above in HPI. Otherwise, all other systems have been reviewed and are negative for complaint.    Medications:  Medications reviewed and verified in NH chart.     Vital Signs: Reviewed in [facility]      Physical Exam  Vitals and nursing note reviewed.   Constitutional:       Appearance: Normal appearance.   HENT:      Head: Normocephalic and atraumatic.      Nose: Nose normal.      Mouth/Throat:      Mouth: Mucous membranes are moist.   Cardiovascular:      Rate and Rhythm: Normal rate and regular rhythm.      Pulses: Normal pulses.      Heart sounds: Normal heart sounds.   Pulmonary:      Effort: Pulmonary effort is normal.      Breath sounds: Normal breath sounds.   Abdominal:      General: Bowel sounds are normal.      Palpations: Abdomen is soft.   Musculoskeletal:         General: Normal range of motion.      Cervical back: Normal range of motion.      Comments: Woundvac dressing in place to left knee   Skin:     General: Skin is warm and dry.   Neurological:      General: No focal deficit present.      Mental Status: She is alert and oriented to person, place, and time.   Psychiatric:         Mood and Affect: Mood normal.         Behavior: Behavior normal.     Results/Data:   Lab Results   Component Value Date    WBC 7.3 01/08/2020    HGB 11.7 (L) 01/08/2020    HCT 35.8 (L) 01/08/2020     01/08/2020    ALT 18  01/08/2020    AST 17 01/08/2020     01/08/2020    K 4.1 01/08/2020     01/08/2020    CREATININE 1.09 (H) 01/08/2020    BUN 33 (H) 01/08/2020    CO2 23 01/08/2020    HGBA1C 6.1 (H) 10/16/2024       Provider Impression:   Status post open reduction internal fixation left patella with postoperative surgical wound infection   -Status post open reduction internal fixation left patella on 3/25/2025  - previous hospitalization from 4/30-->5/9/25, completed 4 week course of PO Doxycycline and Augmentin per ID rec, stop date of 5/28/25  - presented to ER again after discharge to home from this facility with sx's infection left knee, underwent Irrigation debridement left knee with application of wound VAC and injection left knee for evaluation of joint capsular violation on 6/2/25; wound cx grew MRSA  - PICC line in place for four weeks IV ATB's(Vanco) per ID  - continue with Bactrim   - PICC line care per protocol  - continue with woundvac dressing changes to left knee per ortho  - monitor for healing  - follow up with ID  - follow up with Ortho  - continue with pain medications  - continue with PT/OT     Multiple wounds to bilateral lower extremities/history of PVD  - continue with dressing changes per podiatry rec  - continue to monitor for healing  - follow up with podiatry  - pain medication as needed  - continue with Plavix and pentoxifylline      DM2 with peripheral neuropathy  - continue to monitor blood sugar levels  - continue with Farxiga and lispro insulin  - continue with gabapentin for neuropathy     CAD status post stent placement with NSTEMI during most recent hospitalization  - she underwent LHC on 6/2/25 while inpatient with no significant stenosis noted  - continue with Plavix, lipid lowering agent    Hyperlipidemia  - Continue with statin     HTN  - Lisinopril and hydrochlorothiazide, Nifedipine were stopped during most recent hospitalization  - she was started on Carvedilol 6.25mg bid and  Amlodipine 5mg daily  - continue to monitor vital signs which have been stable    Hypothyroidism  - Continue with Synthroid  - TSH on 6/6/25 WNL     Nicotine abuse disorder  - Continue nicotine patch    CKD  - continue to monitor kidney function which was WNL on 6/24/25    GERD  - continue with Pepcid    Hyponatremia  -continue to monitor Na level, was 134 on 6/9/25    Anemia  - continue to monitor CBC    ----------------  Written by Sima Arechiga RN, acting as a scribe for Dr. Zuniga. This note accurately reflects the work and decisions made by Dr. Zuniga.     I, Dr. Zuniga, attest all medical record entries made by the scribe were under my direction and were personally dictated by me. I have reviewed the chart and agree that the record accurately reflects my performance of the history, physical exam, and assessment and plan.     Electronically Signed By: Wilber Moe MD   7/7/25  8:29 AM

## 2025-06-26 NOTE — Clinical Note
Patient: Yamileth Mccormack  : 1955    Encounter Date: 2025    No notes on file    Electronically Signed By: Wilber Moe MD   25  8:50 PM

## 2025-06-26 NOTE — PROGRESS NOTES
Name: Yamileth Mccormack  : 1955  MRN: [id number]  Visit Date: 2025  Chief Complaint: HISTORY AND PHYSICAL    HPI: Yamileth Mccormack is a 70 y.o. female with PMH remarkable for CAD, hypothyroidism, HLD, CKD 3, nicotine abuse, HTN, mood disorder, who recently underwent a left patellar surgery on 3/25/2025. Subsequently, she was receiving help from a nurse with wound care of left knee who noted purulent drainage from left knee incision upon changing dressing and thus she was directed to the ER. She presented to the ER at [facility] on 25.  Labs in ER at that time showed creatnine of 1.5, no leukocytosis. X-ray of the left knee was negative for joint effusion or patella hardware malfunction. She was admitted to [facility] on 25, ID and ortho were consulted and she was started on IV vancomycin and Zosyn. She had PICC line placed.  Orthopedics recommended Postoperative range of motion knee brace placed with locked in extension, activity as tolerated, and weight bearing as tolerated on the left lower extremity. ID recommended 4 weeks of antibiotic therapy, doxycycline and Augmentin upon discharge. While inpatient her BP was elevated and her medication was adjusted. She was followed by podiatry while inpatient for multiple foot wounds, dressings were ordered. Once HDS, she was discharged to [facility] on 25 where she stayed for skilled care until she was discharged to home on 25 with home health care in place. She then returned to the ER at [facility] from home on 25 for increased pain left knee ulcer with warmth, redness and drainage. In the ER, she was  Found to have elevated sCr(1.4) and troponin (0.2), normal WBC, XR w/ left knee effusion. She was admitted on IV Abx for wound infection, heparin infusion for NSTEMI. Orthopedics also consulted -performed Irrigation debridement left knee with application of wound VAC and injection left knee for evaluation of joint capsular violation on 25. Wound culture  grew MRSA. For NSTEMI cardiology was consulted, she underwent LHC on 6/2/25 with no significant stenosis found. Nifedipine, HCTZ discontinued, Coreg started. SNF recommended by PTOT, patient opted for home therapy instead initially, but then was agreeable to SNF placement and this was arranged. PICC line placed prior to discharge for IV Abx. She was discharged to [facility] on 6/9/25.    Subjective: Seen and examined today. She is sitting up in bed awake in no acute distress. She denies any new issues or complaints.     The patient was counseled regarding diagnostic results, instructions for management, risk factor reductions, prognosis, patient and family education, impressions, risks and benefits of treatment options and importance of compliance with treatment. I have reviewed nursing notes since my last visit and document any significant changes Reviewed orders, medications, Labs. Reviewed chart looking at current medications, treatments, labs, x-rays etc.     ROS:  As above in subjective. Otherwise, all other systems have been reviewed and are negative for complaint.    Medications:  Medications reviewed and verified in NH chart.     Medical History[1]    Surgical History[2]    Family History[3]    Social History     Tobacco Use    Smoking status: Every Day     Types: Cigarettes    Smokeless tobacco: Never   Substance Use Topics    Alcohol use: Not Currently       Allergies[4]     Vital Signs:   Vital Signs were reviewed in nursing home documentation.    Physical Exam  Vitals and nursing note reviewed.   Constitutional:       Appearance: Normal appearance.   HENT:      Head: Normocephalic and atraumatic.      Nose: Nose normal.      Mouth/Throat:      Mouth: Mucous membranes are moist.   Cardiovascular:      Rate and Rhythm: Normal rate and regular rhythm.      Pulses: Normal pulses.      Heart sounds: Normal heart sounds.   Pulmonary:      Effort: Pulmonary effort is normal.      Breath sounds: Normal breath  sounds.   Abdominal:      General: Bowel sounds are normal.      Palpations: Abdomen is soft.   Musculoskeletal:         General: Normal range of motion.      Cervical back: Normal range of motion.      Comments: Woundvac dressing in place to left knee   Skin:     General: Skin is warm and dry.   Neurological:      General: No focal deficit present.      Mental Status: She is alert and oriented to person, place, and time.   Psychiatric:         Mood and Affect: Mood normal.         Behavior: Behavior normal.         Results/Data:   Lab Results   Component Value Date    WBC 7.3 01/08/2020    HGB 11.7 (L) 01/08/2020    HCT 35.8 (L) 01/08/2020     01/08/2020    ALT 18 01/08/2020    AST 17 01/08/2020     01/08/2020    K 4.1 01/08/2020     01/08/2020    CREATININE 1.09 (H) 01/08/2020    BUN 33 (H) 01/08/2020    CO2 23 01/08/2020    HGBA1C 6.1 (H) 10/16/2024     Results were reviewed and addressed accordingly. Lab Results were also reviewed in [facility].     Provider Impression:   Status post open reduction internal fixation left patella with postoperative surgical wound infection   -Status post open reduction internal fixation left patella on 3/25/2025  - previous hospitalization from 4/30-->5/9/25, completed 4 week course of PO Doxycycline and Augmentin per ID rec, stop date of 5/28/25  - presented to ER again after discharge to home from this facility with sx's infection left knee, underwent Irrigation debridement left knee with application of wound VAC and injection left knee for evaluation of joint capsular violation on 6/2/25; wound cx grew MRSA  - PICC line in place for four weeks IV ATB's(Vanco) per ID  - continue with Bactrim   - PICC line care per protocol  - continue with woundvac dressing changes to left knee per ortho  - monitor for healing  - follow up with ID  - follow up with Ortho  - continue with pain medications  - consult PT/OT     Multiple wounds to bilateral lower extremities/history  of PVD  - continue with dressing changes per podiatry rec  - monitor for healing  - follow up with podiatry  - pain medication as needed  - continue with Plavix and pentoxifylline      DM2 with peripheral neuropathy  - monitor blood sugar levels  - continue with Farxiga and lispro insulin  - continue with gabapentin for neuropathy     CAD status post stent placement with NSTEMI during most recent hospitalization  - she underwent LHC on 6/2/25 while inpatient with no significant stenosis noted  - continue with Plavix, lipid lowering agent    Hyperlipidemia  - Continue with statin     HTN  - Lisinopril and hydrochlorothiazide, Nifedipine were stopped during most recent hospitalization  - she was started on Carvedilol 6.25mg bid and Amlodipine 5mg daily  - monitor vital signs    Hypothyroidism  - Continue with Synthroid  - TSH on 6/6/25 WNL     Nicotine abuse disorder  - Continue nicotine patch    CKD  - monitor kidney function which was WNL on 6/9/25    GERD  - continue with Pepcid    Hyponatremia  - monitor Na level, was 134 on 6/9/25    Anemia  - monitor CBC  ----------------  Written by Sima Arechiga RN, acting as a scribe for Dr. Zuniga. This note accurately reflects the work and decisions made by Dr. Zuniga.     I, Dr. Zuniga, attest all medical record entries made by the scribe were under my direction and were personally dictated by me. I have reviewed the chart and agree that the record accurately reflects my performance of the history, physical exam, and assessment and plan.            [1] No past medical history on file.  [2]   Past Surgical History:  Procedure Laterality Date    CT ABDOMEN PELVIS ANGIOGRAM W AND/OR WO IV CONTRAST  3/29/2013    CT ABDOMEN PELVIS ANGIOGRAM W AND/OR WO IV CONTRAST [facility]   [3] No family history on file.  [4]   Allergies  Allergen Reactions    Augmentin [Amoxicillin-Pot Clavulanate] GI Upset    Codeine Hallucinations

## 2025-07-05 ENCOUNTER — NURSING HOME VISIT (OUTPATIENT)
Dept: POST ACUTE CARE | Facility: EXTERNAL LOCATION | Age: 70
End: 2025-07-05
Payer: MEDICARE

## 2025-07-05 DIAGNOSIS — S81.802D OPEN WOUND OF BOTH LOWER EXTREMITIES, SUBSEQUENT ENCOUNTER: ICD-10-CM

## 2025-07-05 DIAGNOSIS — S81.801D OPEN WOUND OF BOTH LOWER EXTREMITIES, SUBSEQUENT ENCOUNTER: ICD-10-CM

## 2025-07-05 DIAGNOSIS — I10 HYPERTENSION, UNSPECIFIED TYPE: ICD-10-CM

## 2025-07-05 DIAGNOSIS — E11.42 DM TYPE 2 WITH DIABETIC PERIPHERAL NEUROPATHY: ICD-10-CM

## 2025-07-05 DIAGNOSIS — N18.9 CHRONIC KIDNEY DISEASE, UNSPECIFIED CKD STAGE: ICD-10-CM

## 2025-07-05 DIAGNOSIS — I25.10 CORONARY ARTERY DISEASE INVOLVING NATIVE CORONARY ARTERY OF NATIVE HEART WITHOUT ANGINA PECTORIS: ICD-10-CM

## 2025-07-05 DIAGNOSIS — E87.1 HYPONATREMIA: ICD-10-CM

## 2025-07-05 DIAGNOSIS — Z87.81 S/P OPEN REDUCTION AND INTERNAL FIXATION (ORIF) OF FRACTURE OF LEFT PATELLA: ICD-10-CM

## 2025-07-05 DIAGNOSIS — E03.9 HYPOTHYROIDISM, UNSPECIFIED TYPE: ICD-10-CM

## 2025-07-05 DIAGNOSIS — Z98.890 S/P OPEN REDUCTION AND INTERNAL FIXATION (ORIF) OF FRACTURE OF LEFT PATELLA: ICD-10-CM

## 2025-07-05 DIAGNOSIS — T81.40XD POSTOPERATIVE INFECTION, UNSPECIFIED TYPE, SUBSEQUENT ENCOUNTER: ICD-10-CM

## 2025-07-05 DIAGNOSIS — K21.9 GASTROESOPHAGEAL REFLUX DISEASE WITHOUT ESOPHAGITIS: ICD-10-CM

## 2025-07-05 DIAGNOSIS — D64.9 ANEMIA, UNSPECIFIED TYPE: ICD-10-CM

## 2025-07-05 PROCEDURE — 99309 SBSQ NF CARE MODERATE MDM 30: CPT | Performed by: INTERNAL MEDICINE

## 2025-07-05 NOTE — LETTER
knee for evaluation of joint capsular violation on 6/2/25. Wound culture grew MRSA. For NSTEMI cardiology was consulted, she underwent LHC on 6/2/25 with no significant stenosis found. Nifedipine, HCTZ discontinued, Coreg started. SNF recommended by PTOT, patient opted for home therapy instead initially, but then was agreeable to SNF placement and this was arranged. PICC line placed prior to discharge for IV Abx. She was discharged to [facility] on 6/9/25.    Subjective: Seen and examined today. She is lying in bed awake in no acute distress. She offers no new issues or complaints.    ROS:  As above in HPI. Otherwise, all other systems have been reviewed and are negative for complaint.    Medications:  Medications reviewed and verified in NH chart.     Vital Signs: Reviewed in [facility]      Physical Exam  Vitals and nursing note reviewed.   Constitutional:       Appearance: Normal appearance.   HENT:      Head: Normocephalic and atraumatic.      Nose: Nose normal.      Mouth/Throat:      Mouth: Mucous membranes are moist.   Cardiovascular:      Rate and Rhythm: Normal rate and regular rhythm.      Pulses: Normal pulses.      Heart sounds: Normal heart sounds.   Pulmonary:      Effort: Pulmonary effort is normal.      Breath sounds: Normal breath sounds.   Abdominal:      General: Bowel sounds are normal.      Palpations: Abdomen is soft.   Musculoskeletal:         General: Normal range of motion.      Cervical back: Normal range of motion.      Comments: Woundvac dressing in place to left knee   Skin:     General: Skin is warm and dry.   Neurological:      General: No focal deficit present.      Mental Status: She is alert and oriented to person, place, and time.   Psychiatric:         Mood and Affect: Mood normal.         Behavior: Behavior normal.     Results/Data:   Lab Results   Component Value Date    WBC 7.3 01/08/2020    HGB 11.7 (L) 01/08/2020    HCT 35.8 (L) 01/08/2020     01/08/2020    ALT 18  01/08/2020    AST 17 01/08/2020     01/08/2020    K 4.1 01/08/2020     01/08/2020    CREATININE 1.09 (H) 01/08/2020    BUN 33 (H) 01/08/2020    CO2 23 01/08/2020    HGBA1C 6.1 (H) 10/16/2024       Provider Impression:   Status post open reduction internal fixation left patella with postoperative surgical wound infection   -Status post open reduction internal fixation left patella on 3/25/2025  - previous hospitalization from 4/30-->5/9/25, completed 4 week course of PO Doxycycline and Augmentin per ID rec, stop date of 5/28/25  - presented to ER again after discharge to home from this facility with sx's infection left knee, underwent Irrigation debridement left knee with application of wound VAC and injection left knee for evaluation of joint capsular violation on 6/2/25; wound cx grew MRSA  - PICC line in place  - continue with IV ATB's(  four weeks IV Vanco) per ID  - continue with Bactrim   - PICC line care per protocol  - continue with woundvac dressing changes to left knee per ortho  - monitor for healing  - follow up with ID  - follow up with Ortho  - continue with pain medications  - continue with PT/OT, per progress notes, she will be discharging to home soon     Multiple wounds to bilateral lower extremities/history of PVD  - continue with dressing changes per podiatry rec  - continue to monitor for healing  - follow up with podiatry  - pain medication as needed  - continue with Plavix and pentoxifylline      DM2 with peripheral neuropathy  - continue to monitor blood sugar levels  - continue with Farxiga and lispro insulin  - continue with gabapentin for neuropathy     CAD status post stent placement with NSTEMI during most recent hospitalization  - she underwent LHC on 6/2/25 while inpatient with no significant stenosis noted  - continue with Plavix, lipid lowering agent  - follow up with cardiology    Hyperlipidemia  - Continue with statin     HTN  - Lisinopril and hydrochlorothiazide,  Nifedipine were stopped during most recent hospitalization  - she was started on Carvedilol 6.25mg bid and Amlodipine 5mg daily  - continue to monitor vital signs which continue to be stable    Hypothyroidism  - Continue with Synthroid  - TSH on 6/6/25 WNL     Nicotine abuse disorder  - Continue nicotine patch    CKD  - continue to monitor kidney function which was WNL on 6/24/25    GERD  - continue with Pepcid    Hyponatremia  -continue to monitor Na level, was 141 on 6/30/25    Anemia  - continue to monitor CBC which has been stable    ----------------  Written by Sima Arechiga RN, acting as a scribe for Dr. Zuniga. This note accurately reflects the work and decisions made by Dr. Zuniga.     I, Dr. Zuniga, attest all medical record entries made by the scribe were under my direction and were personally dictated by me. I have reviewed the chart and agree that the record accurately reflects my performance of the history, physical exam, and assessment and plan.     Electronically Signed By: Wilber Moe MD   7/14/25  8:48 AM

## 2025-07-05 NOTE — Clinical Note
Patient: Yamileth Mccormack  : 1955    Encounter Date: 2025    No notes on file    Electronically Signed By: Wilber Moe MD   25  2:18 PM

## 2025-07-06 PROBLEM — E87.1 HYPONATREMIA: Status: ACTIVE | Noted: 2025-07-06

## 2025-07-06 PROBLEM — D64.9 ANEMIA: Status: ACTIVE | Noted: 2025-07-06

## 2025-07-07 NOTE — PROGRESS NOTES
Name: Yamileth Mccormack  : 1955  MRN: [id number]  Visit Date: 2025  Chief Complaint: WEEKLY SNF PHYSICIAN VISIT    HPI: Yamileth Mccormack is a 70 y.o. female with PMH remarkable for CAD, hypothyroidism, HLD, CKD 3, nicotine abuse, HTN, mood disorder, who recently underwent a left patellar surgery on 3/25/2025. Subsequently, she was receiving help from a nurse with wound care of left knee who noted purulent drainage from left knee incision upon changing dressing and thus she was directed to the ER. She presented to the ER at [facility] on 25.  Labs in ER at that time showed creatnine of 1.5, no leukocytosis. X-ray of the left knee was negative for joint effusion or patella hardware malfunction. She was admitted to [facility] on 25, ID and ortho were consulted and she was started on IV vancomycin and Zosyn. She had PICC line placed.  Orthopedics recommended Postoperative range of motion knee brace placed with locked in extension, activity as tolerated, and weight bearing as tolerated on the left lower extremity. ID recommended 4 weeks of antibiotic therapy, doxycycline and Augmentin upon discharge. While inpatient her BP was elevated and her medication was adjusted. She was followed by podiatry while inpatient for multiple foot wounds, dressings were ordered. Once HDS, she was discharged to [facility] on 25 where she stayed for skilled care until she was discharged to home on 25 with home health care in place. She then returned to the ER at [facility] from home on 25 for increased pain left knee ulcer with warmth, redness and drainage. In the ER, she was  Found to have elevated sCr(1.4) and troponin (0.2), normal WBC, XR w/ left knee effusion. She was admitted on IV Abx for wound infection, heparin infusion for NSTEMI. Orthopedics also consulted -performed Irrigation debridement left knee with application of wound VAC and injection left knee for evaluation of joint capsular violation on 25. Wound  culture grew MRSA. For NSTEMI cardiology was consulted, she underwent LHC on 6/2/25 with no significant stenosis found. Nifedipine, HCTZ discontinued, Coreg started. SNF recommended by PTOT, patient opted for home therapy instead initially, but then was agreeable to SNF placement and this was arranged. PICC line placed prior to discharge for IV Abx. She was discharged to [facility] on 6/9/25.    Subjective: Seen and examined today. She is sitting up in bed awake in no acute distress. She denies any new issues or complaints.     ROS:  As above in HPI. Otherwise, all other systems have been reviewed and are negative for complaint.    Medications:  Medications reviewed and verified in NH chart.     Vital Signs: Reviewed in [facility]      Physical Exam  Vitals and nursing note reviewed.   Constitutional:       Appearance: Normal appearance.   HENT:      Head: Normocephalic and atraumatic.      Nose: Nose normal.      Mouth/Throat:      Mouth: Mucous membranes are moist.   Cardiovascular:      Rate and Rhythm: Normal rate and regular rhythm.      Pulses: Normal pulses.      Heart sounds: Normal heart sounds.   Pulmonary:      Effort: Pulmonary effort is normal.      Breath sounds: Normal breath sounds.   Abdominal:      General: Bowel sounds are normal.      Palpations: Abdomen is soft.   Musculoskeletal:         General: Normal range of motion.      Cervical back: Normal range of motion.      Comments: Woundvac dressing in place to left knee   Skin:     General: Skin is warm and dry.   Neurological:      General: No focal deficit present.      Mental Status: She is alert and oriented to person, place, and time.   Psychiatric:         Mood and Affect: Mood normal.         Behavior: Behavior normal.     Results/Data:   Lab Results   Component Value Date    WBC 7.3 01/08/2020    HGB 11.7 (L) 01/08/2020    HCT 35.8 (L) 01/08/2020     01/08/2020    ALT 18 01/08/2020    AST 17 01/08/2020     01/08/2020    K 4.1  01/08/2020     01/08/2020    CREATININE 1.09 (H) 01/08/2020    BUN 33 (H) 01/08/2020    CO2 23 01/08/2020    HGBA1C 6.1 (H) 10/16/2024       Provider Impression:   Status post open reduction internal fixation left patella with postoperative surgical wound infection   -Status post open reduction internal fixation left patella on 3/25/2025  - previous hospitalization from 4/30-->5/9/25, completed 4 week course of PO Doxycycline and Augmentin per ID rec, stop date of 5/28/25  - presented to ER again after discharge to home from this facility with sx's infection left knee, underwent Irrigation debridement left knee with application of wound VAC and injection left knee for evaluation of joint capsular violation on 6/2/25; wound cx grew MRSA  - PICC line in place for four weeks IV ATB's(Vanco) per ID  - continue with Bactrim   - PICC line care per protocol  - continue with woundvac dressing changes to left knee per ortho  - monitor for healing  - follow up with ID  - follow up with Ortho  - continue with pain medications  - continue with PT/OT     Multiple wounds to bilateral lower extremities/history of PVD  - continue with dressing changes per podiatry rec  - continue to monitor for healing  - follow up with podiatry  - pain medication as needed  - continue with Plavix and pentoxifylline      DM2 with peripheral neuropathy  - continue to monitor blood sugar levels  - continue with Farxiga and lispro insulin  - continue with gabapentin for neuropathy     CAD status post stent placement with NSTEMI during most recent hospitalization  - she underwent LHC on 6/2/25 while inpatient with no significant stenosis noted  - continue with Plavix, lipid lowering agent    Hyperlipidemia  - Continue with statin     HTN  - Lisinopril and hydrochlorothiazide, Nifedipine were stopped during most recent hospitalization  - she was started on Carvedilol 6.25mg bid and Amlodipine 5mg daily  - continue to monitor vital signs which have  been stable    Hypothyroidism  - Continue with Synthroid  - TSH on 6/6/25 WNL     Nicotine abuse disorder  - Continue nicotine patch    CKD  - continue to monitor kidney function which was WNL on 6/24/25    GERD  - continue with Pepcid    Hyponatremia  -continue to monitor Na level, was 134 on 6/9/25    Anemia  - continue to monitor CBC    ----------------  Written by Sima Arechiga RN, acting as a scribe for Dr. Zuniga. This note accurately reflects the work and decisions made by Dr. Zuniga.     I, Dr. Zuniga, attest all medical record entries made by the scribe were under my direction and were personally dictated by me. I have reviewed the chart and agree that the record accurately reflects my performance of the history, physical exam, and assessment and plan.

## 2025-07-13 NOTE — PROGRESS NOTES
Name: Yamileth Mccormack  : 1955  MRN: [id number]  Visit Date: 2025  Chief Complaint: WEEKLY SNF PHYSICIAN VISIT    HPI: Yamileth Mccormack is a 70 y.o. female with PMH remarkable for CAD, hypothyroidism, HLD, CKD 3, nicotine abuse, HTN, mood disorder, who recently underwent a left patellar surgery on 3/25/2025. Subsequently, she was receiving help from a nurse with wound care of left knee who noted purulent drainage from left knee incision upon changing dressing and thus she was directed to the ER. She presented to the ER at [facility] on 25.  Labs in ER at that time showed creatnine of 1.5, no leukocytosis. X-ray of the left knee was negative for joint effusion or patella hardware malfunction. She was admitted to [facility] on 25, ID and ortho were consulted and she was started on IV vancomycin and Zosyn. She had PICC line placed.  Orthopedics recommended Postoperative range of motion knee brace placed with locked in extension, activity as tolerated, and weight bearing as tolerated on the left lower extremity. ID recommended 4 weeks of antibiotic therapy, doxycycline and Augmentin upon discharge. While inpatient her BP was elevated and her medication was adjusted. She was followed by podiatry while inpatient for multiple foot wounds, dressings were ordered. Once HDS, she was discharged to [facility] on 25 where she stayed for skilled care until she was discharged to home on 25 with home health care in place. She then returned to the ER at [facility] from home on 25 for increased pain left knee ulcer with warmth, redness and drainage. In the ER, she was  Found to have elevated sCr(1.4) and troponin (0.2), normal WBC, XR w/ left knee effusion. She was admitted on IV Abx for wound infection, heparin infusion for NSTEMI. Orthopedics also consulted -performed Irrigation debridement left knee with application of wound VAC and injection left knee for evaluation of joint capsular violation on 25. Wound  culture grew MRSA. For NSTEMI cardiology was consulted, she underwent LHC on 6/2/25 with no significant stenosis found. Nifedipine, HCTZ discontinued, Coreg started. SNF recommended by PTOT, patient opted for home therapy instead initially, but then was agreeable to SNF placement and this was arranged. PICC line placed prior to discharge for IV Abx. She was discharged to [facility] on 6/9/25.    Subjective: Seen and examined today. She is lying in bed awake in no acute distress. She offers no new issues or complaints.    ROS:  As above in HPI. Otherwise, all other systems have been reviewed and are negative for complaint.    Medications:  Medications reviewed and verified in NH chart.     Vital Signs: Reviewed in [facility]      Physical Exam  Vitals and nursing note reviewed.   Constitutional:       Appearance: Normal appearance.   HENT:      Head: Normocephalic and atraumatic.      Nose: Nose normal.      Mouth/Throat:      Mouth: Mucous membranes are moist.   Cardiovascular:      Rate and Rhythm: Normal rate and regular rhythm.      Pulses: Normal pulses.      Heart sounds: Normal heart sounds.   Pulmonary:      Effort: Pulmonary effort is normal.      Breath sounds: Normal breath sounds.   Abdominal:      General: Bowel sounds are normal.      Palpations: Abdomen is soft.   Musculoskeletal:         General: Normal range of motion.      Cervical back: Normal range of motion.      Comments: Woundvac dressing in place to left knee   Skin:     General: Skin is warm and dry.   Neurological:      General: No focal deficit present.      Mental Status: She is alert and oriented to person, place, and time.   Psychiatric:         Mood and Affect: Mood normal.         Behavior: Behavior normal.     Results/Data:   Lab Results   Component Value Date    WBC 7.3 01/08/2020    HGB 11.7 (L) 01/08/2020    HCT 35.8 (L) 01/08/2020     01/08/2020    ALT 18 01/08/2020    AST 17 01/08/2020     01/08/2020    K 4.1 01/08/2020      01/08/2020    CREATININE 1.09 (H) 01/08/2020    BUN 33 (H) 01/08/2020    CO2 23 01/08/2020    HGBA1C 6.1 (H) 10/16/2024       Provider Impression:   Status post open reduction internal fixation left patella with postoperative surgical wound infection   -Status post open reduction internal fixation left patella on 3/25/2025  - previous hospitalization from 4/30-->5/9/25, completed 4 week course of PO Doxycycline and Augmentin per ID rec, stop date of 5/28/25  - presented to ER again after discharge to home from this facility with sx's infection left knee, underwent Irrigation debridement left knee with application of wound VAC and injection left knee for evaluation of joint capsular violation on 6/2/25; wound cx grew MRSA  - PICC line in place  - continue with IV ATB's(  four weeks IV Vanco) per ID  - continue with Bactrim   - PICC line care per protocol  - continue with woundvac dressing changes to left knee per ortho  - monitor for healing  - follow up with ID  - follow up with Ortho  - continue with pain medications  - continue with PT/OT, per progress notes, she will be discharging to home soon     Multiple wounds to bilateral lower extremities/history of PVD  - continue with dressing changes per podiatry rec  - continue to monitor for healing  - follow up with podiatry  - pain medication as needed  - continue with Plavix and pentoxifylline      DM2 with peripheral neuropathy  - continue to monitor blood sugar levels  - continue with Farxiga and lispro insulin  - continue with gabapentin for neuropathy     CAD status post stent placement with NSTEMI during most recent hospitalization  - she underwent LHC on 6/2/25 while inpatient with no significant stenosis noted  - continue with Plavix, lipid lowering agent  - follow up with cardiology    Hyperlipidemia  - Continue with statin     HTN  - Lisinopril and hydrochlorothiazide, Nifedipine were stopped during most recent hospitalization  - she was started  on Carvedilol 6.25mg bid and Amlodipine 5mg daily  - continue to monitor vital signs which continue to be stable    Hypothyroidism  - Continue with Synthroid  - TSH on 6/6/25 WNL     Nicotine abuse disorder  - Continue nicotine patch    CKD  - continue to monitor kidney function which was WNL on 6/24/25    GERD  - continue with Pepcid    Hyponatremia  -continue to monitor Na level, was 141 on 6/30/25    Anemia  - continue to monitor CBC which has been stable    ----------------  Written by Sima Arechiga RN, acting as a scribe for Dr. Zuniga. This note accurately reflects the work and decisions made by Dr. Zuniga.     I, Dr. Zuniga, attest all medical record entries made by the scribe were under my direction and were personally dictated by me. I have reviewed the chart and agree that the record accurately reflects my performance of the history, physical exam, and assessment and plan.

## 2025-07-28 ENCOUNTER — APPOINTMENT (OUTPATIENT)
Facility: CLINIC | Age: 70
End: 2025-07-28
Payer: MEDICARE

## 2025-07-28 PROCEDURE — 99213 OFFICE O/P EST LOW 20 MIN: CPT | Mod: 25

## 2025-07-28 PROCEDURE — 11042 DBRDMT SUBQ TIS 1ST 20SQCM/<: CPT

## 2025-08-04 ENCOUNTER — APPOINTMENT (OUTPATIENT)
Facility: CLINIC | Age: 70
End: 2025-08-04
Payer: MEDICARE

## 2025-08-04 PROBLEM — I73.9 PAD (PERIPHERAL ARTERY DISEASE): Status: ACTIVE | Noted: 2025-08-04

## 2025-08-04 PROBLEM — G47.09 OTHER INSOMNIA: Status: ACTIVE | Noted: 2025-08-04

## 2025-08-04 PROBLEM — L30.9 DERMATITIS: Status: ACTIVE | Noted: 2025-08-04

## 2025-08-04 PROBLEM — E55.9 VITAMIN D DEFICIENCY: Status: ACTIVE | Noted: 2025-08-04

## 2025-08-04 PROBLEM — K55.1 MESENTERIC ARTERY STENOSIS (MULTI): Status: ACTIVE | Noted: 2025-08-04

## 2025-08-04 PROBLEM — I77.1 STENOSIS OF ILIAC ARTERY: Status: ACTIVE | Noted: 2025-08-04

## 2025-08-04 PROCEDURE — 11043 DBRDMT MUSC&/FSCA 1ST 20/<: CPT

## 2025-08-04 PROCEDURE — 11046 DBRDMT MUSC&/FSCA EA ADDL: CPT

## 2025-08-04 PROCEDURE — 97605 NEG PRS WND THER DME<=50SQCM: CPT | Mod: RT

## 2025-08-05 NOTE — PROGRESS NOTES
Chief Complaint:   SNF F/U  -Left knee infection  -Physical deconditioning/weakness    HPI:   70 year-old female with PMH of CAD, hypothyroidism, HLD, CKD Stage III, nicotine dependence, HTN, mood disorder, and L patellar surgery (3/25/25) who presented to the ER on 4/30/25 w/ c/f L knee surgical wound infection. Pt. reported purulent drainage to her incision. Her [facility] nurse advised her to go to the ER for evaluation. Work-up in ER: Cr 1.5, XR of L knee neg for joint effusion or patella hardware malfunction. She was started on IV ATB and admitted for further care. Hospital course:    Left knee infection-IV ATB, ortho and ID consult, knee brace placed locked in extension, WBAT, ID recommending 4 weeks of ATB therapy (doxycycline and augmentin), F/U with ID and ortho as scheduled  Physical deconditioning/weakness-PT/OT recommending SNF  HTN-BP medication adjusted, BP monitored  KENDELL-renal function monitored  Multiple BLE ulcers-local wound care, F/U with podiatry/vascular as an OP     Pt. was HDS and discharged to [facility] on 5/9/25. Today, patient reports poor PO intake, nausea, and vomiting, which she feels is 2/2 Augmentin. Staff report that she has been refusing to take Augmentin due to stomach upset/GI symptoms. She denies fevers or chills. She reports L knee pain well-controlled on current Rx. Staff report no other clinical concerns at this time.     ROS:    As above in HPI. Otherwise, all other systems have been reviewed and are negative for complaint.    Medications reviewed and verified in NH chart.     Problem List[1]     Medical History[2]    Surgical History[3]    Family History[4]    Tobacco Use History[5]    Social History     Substance and Sexual Activity   Alcohol Use Not Currently       Social History     Substance and Sexual Activity   Drug Use Never       Allergies[6]     Vital Signs:   107/76-80-18-98.2-98% on RA    Physical Exam:  General: Sitting up in WC in NAD, alert   Head/Face: NCAT,  symmetrical  Eyes: PERRLA, no injection, no discharge  ENT: Hearing not impaired, ears without scars or lesions, nasal mucosa and turbinates pink, septum midline, lips pink and moist  Neck: Supple, symmetrical  Respiratory: CTA without adventitious sounds, respirations even and nonlabored without use of accessory muscles, good air exchange  Cardio: RRR without murmur or gallops, normal S1S2, no edema, pedal pulses 3+/4 bilaterally  Chest/Breast: Symmetrical  GI: BS x 4, normoactive, non-distended, abd round and soft, no masses or tenderness  : No suprapubic tenderness or distention  MSK: Gait not assessed, joints with full ROM without pain or contractures, + generalized weakness, KI locked in extension to LLE  Skin: Skin warm and dry, no induration, DSG to L knee D&I, unstageable ulcer of L shin, L anterior foot, L knee, L heel, and L achilles   Neurologic: Cranial nerves II through XII intact, superficial touch and pain sensation intact  Psychiatric: Alert to person, place, and time, calm and cooperative     Results/Data:   5/12/25: K+ 3.4, GFR 55, TSH 0.741, HgbA1C 6.3, Hgb 9.8, Hct 30.4    Assessment/Plan:  Left knee infection s/p ORIF of L patella fracture (3/25/25)-discussed patient's refusal of Augmentin with ID, can D/C Augmentin and start on Cefuroxime 500 mg PO BID, c/w Doxycycline, ATB end date 5/28, c/w Tylenol prn for mild pain, Percocet prn for mod-severe pain, monitor labs, c/w local wound care, F/U with ortho and ID as scheduled  N/V/GI upset-2/2 Augmentin, D/C'ed per ID, start on acidophilus 1 capsule PO BID, Zofran prn, monitor  Physical deconditioning/weakness-c/w PT/OT, safety and fall precautions  HTN/HLD/CAD/PAD/PVD-2 gm Na+ diet, c/w atorvastatin, plavix, trental, nifedipine (decrease to 60 mg daily), HCTZ, and lisinopril, monitor BP and HR, monitor lipid panel and LFTs, F/U with vascular after discharge  Multiple BLE ulcers-c/w local wound care, increase protein intake, wound care team to  follow, F/U with podiatry and vascular after discharge  DM2-CCD diet, accuchecks, c/w lispro ISS and farxiga, monitor HgbA1C  Tobacco abuse-smoking cessation encouraged, c/w nicotine patch  Hypothyroidism-c/w levothyroxine, monitor TSH/FT4  Anemia-c/w MVI, monitor CBC  GERD-c/w pepcid  Neuropathy-c/w gabapentin   CKD Stage III-avoid nephrotoxic drugs, renally dose medications as able, monitor BMP     Orders:  D/C Augmentin   Cefuroxime 500 mg PO BID (stop date 5/28)   Decrease Nifedipine ER to 60 mg PO Q AM   Zofran 4 mg PO Q 6 hours prn for N/V   Acidophilus 1 capsule PO BID      Code Status:   Full Code    Time spent reviewing patient's chart on the unit (PMH, PSH, FH, Social Hx AND progress and/or consult notes, labs, and radiology results): 27 minutes  Time spent interviewing and/or examining the patient: 11 minutes  Time spent writing note on the unit: 5 minutes  Time spent reviewing POC w/ patient, family, and/or staff: 7 minutes  Total visit time: 50 minutes. Greater than 50% of time was spent on counseling and/or coordination of care of the patient. Start time: 12:59 PM, End time: 1:49 PM.           [1]   Patient Active Problem List  Diagnosis    S/P open reduction and internal fixation (ORIF) of fracture of left patella    Postoperative infection    Hypertension    Open wound of both lower extremities    PVD (peripheral vascular disease)    Gastroesophageal reflux disease without esophagitis    Coronary artery disease involving native coronary artery of native heart without angina pectoris    Hyperlipidemia    DM type 2 with diabetic peripheral neuropathy    Hypothyroidism    Nicotine abuse    Chronic kidney disease, stage 3b (Multi)    Hyponatremia    Anemia    PAD (peripheral artery disease)    Vitamin D deficiency    Mesenteric artery stenosis (Multi)    Other insomnia    Stenosis of iliac artery    Dermatitis   [2]   Past Medical History:  Diagnosis Date    Pancreatitis ([facility])    [3]   Past Surgical  History:  Procedure Laterality Date    APPENDECTOMY      CHOLECYSTECTOMY      CT ABDOMEN PELVIS ANGIOGRAM W AND/OR WO IV CONTRAST  03/29/2013    CT ABDOMEN PELVIS ANGIOGRAM W AND/OR WO IV CONTRAST [facility]    OTHER SURGICAL HISTORY      excision of pilonidal cyst    OTHER SURGICAL HISTORY      R iliac artery stent, aortogram/angiogram bilateral legs    OTHER SURGICAL HISTORY  09/27/2021    left external iliac artery drug coated balloon angioplasty, left external iliac artery stent placement, and repair of left brachial artery    PATELLA FRACTURE SURGERY Left 03/25/2025    ORIF of L patella fracture    TRIGGER FINGER RELEASE Left    [4]   Family History  Problem Relation Name Age of Onset    COPD Mother      Hypertension Mother      Other (Polio) Mother      Stroke Father      Cancer Brother      Heart disease Brother      COPD Brother      Kidney disease Brother      Multiple sclerosis Brother      Diabetes Mother's Brother      Breast cancer Maternal Grandmother      Diabetes Paternal Grandmother      Blindness Paternal Grandmother     [5]   Social History  Tobacco Use   Smoking Status Every Day    Types: Cigarettes   Smokeless Tobacco Never   [6]   Allergies  Allergen Reactions    Other Shortness of breath     Horse hair and dander    Hydrocodone-Acetaminophen Other and Unknown    Statins-Hmg-Coa Reductase Inhibitors Myalgia     Lipitor, severe muscle pain    Augmentin [Amoxicillin-Pot Clavulanate] GI Upset    Codeine Hallucinations    Sertraline Unknown    Hydromorphone (Bulk) GI Upset    Levofloxacin Unknown and Nausea/vomiting     Pt had nausea and vomiting    Nsaids (Non-Steroidal Anti-Inflammatory Drug) GI Upset     g.i. distress

## 2025-08-12 ENCOUNTER — APPOINTMENT (OUTPATIENT)
Facility: CLINIC | Age: 70
End: 2025-08-12
Payer: MEDICARE

## 2025-08-18 ENCOUNTER — APPOINTMENT (OUTPATIENT)
Facility: CLINIC | Age: 70
End: 2025-08-18
Payer: MEDICARE

## 2025-08-18 DIAGNOSIS — L97.522: Primary | ICD-10-CM

## 2025-08-18 DIAGNOSIS — L97.222 NON-PRESSURE CHRONIC ULCER OF LEFT CALF WITH FAT LAYER EXPOSED (MULTI): ICD-10-CM

## 2025-08-18 PROCEDURE — 11045 DBRDMT SUBQ TISS EACH ADDL: CPT | Mod: LT

## 2025-08-18 PROCEDURE — 11042 DBRDMT SUBQ TIS 1ST 20SQCM/<: CPT | Mod: LT

## 2025-08-18 RX ORDER — GENTAMICIN SULFATE 1 MG/G
OINTMENT TOPICAL DAILY
Qty: 30 G | Refills: 1 | Status: SHIPPED | OUTPATIENT
Start: 2025-08-18 | End: 2025-08-28

## 2025-08-24 LAB
BACTERIA SPEC AEROBE CULT: ABNORMAL
BACTERIA SPEC ANAEROBE CULT: ABNORMAL

## 2025-08-25 ENCOUNTER — PREP FOR PROCEDURE (OUTPATIENT)
Dept: WOUND CARE | Facility: HOSPITAL | Age: 70
End: 2025-08-25

## 2025-08-25 ENCOUNTER — APPOINTMENT (OUTPATIENT)
Facility: CLINIC | Age: 70
End: 2025-08-25
Payer: MEDICARE

## 2025-08-25 DIAGNOSIS — Z22.322 MRSA (METHICILLIN RESISTANT STAPH AUREUS) CULTURE POSITIVE: Primary | ICD-10-CM

## 2025-08-25 PROCEDURE — 11042 DBRDMT SUBQ TIS 1ST 20SQCM/<: CPT | Mod: LT

## 2025-08-25 PROCEDURE — 11045 DBRDMT SUBQ TISS EACH ADDL: CPT | Mod: LT

## 2025-08-25 RX ORDER — DOXYCYCLINE 100 MG/1
100 CAPSULE ORAL 2 TIMES DAILY
Qty: 20 CAPSULE | Refills: 0 | Status: SHIPPED | OUTPATIENT
Start: 2025-08-25 | End: 2025-09-04

## 2025-09-04 ENCOUNTER — APPOINTMENT (OUTPATIENT)
Facility: CLINIC | Age: 70
End: 2025-09-04
Payer: MEDICARE